# Patient Record
Sex: MALE | Race: WHITE | NOT HISPANIC OR LATINO | URBAN - METROPOLITAN AREA
[De-identification: names, ages, dates, MRNs, and addresses within clinical notes are randomized per-mention and may not be internally consistent; named-entity substitution may affect disease eponyms.]

---

## 2021-10-16 ENCOUNTER — HOSPITAL ENCOUNTER (EMERGENCY)
Facility: HOSPITAL | Age: 46
Discharge: HOME | End: 2021-10-16
Admitting: EMERGENCY MEDICINE

## 2021-10-16 VITALS
OXYGEN SATURATION: 98 % | BODY MASS INDEX: 24.11 KG/M2 | TEMPERATURE: 98 F | SYSTOLIC BLOOD PRESSURE: 146 MMHG | HEART RATE: 88 BPM | HEIGHT: 66 IN | WEIGHT: 150 LBS | RESPIRATION RATE: 16 BRPM | DIASTOLIC BLOOD PRESSURE: 72 MMHG

## 2021-10-16 VITALS
SYSTOLIC BLOOD PRESSURE: 96 MMHG | BODY MASS INDEX: 24.11 KG/M2 | HEIGHT: 66 IN | WEIGHT: 150 LBS | OXYGEN SATURATION: 97 % | DIASTOLIC BLOOD PRESSURE: 59 MMHG | HEART RATE: 64 BPM | TEMPERATURE: 97.8 F | RESPIRATION RATE: 15 BRPM

## 2021-10-16 DIAGNOSIS — F19.10 SUBSTANCE ABUSE (HCC): Primary | ICD-10-CM

## 2021-10-16 DIAGNOSIS — L29.3 ITCHING OF PENIS: ICD-10-CM

## 2021-10-16 DIAGNOSIS — S81.801A LEG WOUND, RIGHT, INITIAL ENCOUNTER: Primary | ICD-10-CM

## 2021-10-16 PROCEDURE — 87147 CULTURE TYPE IMMUNOLOGIC: CPT | Performed by: NURSE PRACTITIONER

## 2021-10-16 PROCEDURE — 87205 SMEAR GRAM STAIN: CPT | Performed by: NURSE PRACTITIONER

## 2021-10-16 PROCEDURE — 96372 THER/PROPH/DIAG INJ SC/IM: CPT

## 2021-10-16 PROCEDURE — 87070 CULTURE OTHR SPECIMN AEROBIC: CPT | Performed by: NURSE PRACTITIONER

## 2021-10-16 PROCEDURE — 99283 EMERGENCY DEPT VISIT LOW MDM: CPT

## 2021-10-16 PROCEDURE — 87186 SC STD MICRODIL/AGAR DIL: CPT | Performed by: NURSE PRACTITIONER

## 2021-10-16 PROCEDURE — 25010000002 CEFTRIAXONE PER 250 MG: Performed by: NURSE PRACTITIONER

## 2021-10-16 RX ORDER — AZITHROMYCIN 250 MG/1
1000 TABLET, FILM COATED ORAL ONCE
Status: COMPLETED | OUTPATIENT
Start: 2021-10-16 | End: 2021-10-16

## 2021-10-16 RX ORDER — SODIUM CHLORIDE 0.9 % (FLUSH) 0.9 %
10 SYRINGE (ML) INJECTION AS NEEDED
Status: DISCONTINUED | OUTPATIENT
Start: 2021-10-16 | End: 2021-10-17 | Stop reason: HOSPADM

## 2021-10-16 RX ADMIN — AZITHROMYCIN 1000 MG: 250 TABLET, FILM COATED ORAL at 07:32

## 2021-10-16 RX ADMIN — LIDOCAINE HYDROCHLORIDE 500 MG: 10 INJECTION, SOLUTION EPIDURAL; INFILTRATION; INTRACAUDAL; PERINEURAL at 07:31

## 2021-10-16 NOTE — ED PROVIDER NOTES
Subjective   Patient is a 46-year-old white male who is brought in by EMS after he was found in a gas station parking lot this morning.  He was apparently found to be yelling and holding his genital area stating that his penis was hurting and itching.  Reportedly the police were on scene and states that the patient was given the option to go to [facility] or go to the hospital and she opted to go to the hospital and EMS was called.  It is also reported that the patient was found to be masturbating on scene.  On arrival to the ED he states he did take ecstasy.  He states he has had some itching to his penis.  Denies any drainage.  Denies any difficulty urinating or painful urination.  Poor historian given his current substance use.          Review of Systems   Constitutional: Negative for fever.   Gastrointestinal: Negative for abdominal pain and vomiting.   Genitourinary: Positive for penile pain. Negative for difficulty urinating, dysuria, penile discharge, penile swelling, scrotal swelling and testicular pain.   Skin: Negative for rash.       No past medical history on file.    Not on File    No past surgical history on file.    No family history on file.    Social History     Socioeconomic History   • Marital status: Unknown           Objective   Physical Exam  Vital signs and triage nurse note reviewed.  Constitutional: Awake, alert; well-developed and well-nourished. No acute distress is noted.  Unkempt, appears to be under the influence.  HEENT: Normocephalic, atraumatic; pupils are PERRL with intact EOM; oropharynx is pink and moist without exudate or erythema.  No drooling or pooling of oral secretions.  Edentulous.  Neck: Supple, full range of motion without pain; no cervical lymphadenopathy. Normal phonation.  Cardiovascular: Regular rate and rhythm, normal S1-S2.  No murmur noted.  Pulmonary: Respiratory effort regular nonlabored, breath sounds clear to auscultation all fields.  Abdomen: Soft, nontender,  nondistended with normoactive bowel sounds; no rebound or guarding.  Genital exam reveals a circumcised male with bilateral descended testicles.  There are no external lesions or rash noted.  No drainage noted.  No redness.  No swelling to the penis or scrotum.  Musculoskeletal: Independent range of motion of all extremities with no palpable tenderness or edema.  Neuro: Alert oriented x3, speech is somewhat garbled but appropriate.  GCS 15.    Skin: Flesh tone, warm, dry, intact; no erythematous or petechial rash or lesion.      Procedures           ED Course                                           MDM  Number of Diagnoses or Management Options  Itching of penis  Substance abuse (HCC)  Diagnosis management comments: Urinalysis was ordered however patient was not able to produce a sample.  He was given Rocephin 500 mg IM as well as 1 g of oral Zithromax.    He is otherwise well-appearing and in no distress with no new complaints.    Diagnosis and treatment plan discussed with patient.  Patient agreeable to plan.   I discussed findings with patient who voices understanding of discharge instructions, signs and symptoms requiring return to ED; discharged improved and in stable condition with follow up for re-evaluation.      Patient Progress  Patient progress: stable      Final diagnoses:   Substance abuse (HCC)   Itching of penis       ED Disposition  ED Disposition     ED Disposition Condition Comment    Discharge Stable           No follow-up provider specified.       Medication List      No changes were made to your prescriptions during this visit.          Jana Malone, YVONNE  10/16/21 0815

## 2021-10-16 NOTE — DISCHARGE INSTRUCTIONS
Follow-up with primary care provider and/or urology if your symptoms persist.  Return for new or worsening symptoms.  Stop using drugs.

## 2021-10-16 NOTE — ED NOTES
"Pt picked up at [employer] station in [place], pt altered and reports \"taking a pink pill with a X on it from some nette on the street.\" Vitals stable      Lucia Smith, MANDY  10/16/21 0614       Lucia Smith, RN  10/16/21 0616    "

## 2021-10-17 ENCOUNTER — HOSPITAL ENCOUNTER (EMERGENCY)
Facility: HOSPITAL | Age: 46
Discharge: LEFT BEFORE DISCHARGE | End: 2021-10-17

## 2021-10-17 ENCOUNTER — HOSPITAL ENCOUNTER (EMERGENCY)
Facility: HOSPITAL | Age: 46
Discharge: HOME | End: 2021-10-17
Attending: EMERGENCY MEDICINE | Admitting: EMERGENCY MEDICINE

## 2021-10-17 ENCOUNTER — APPOINTMENT (OUTPATIENT)
Dept: GENERAL RADIOLOGY | Facility: HOSPITAL | Age: 46
End: 2021-10-17

## 2021-10-17 VITALS
OXYGEN SATURATION: 100 % | RESPIRATION RATE: 15 BRPM | DIASTOLIC BLOOD PRESSURE: 79 MMHG | WEIGHT: 165.79 LBS | BODY MASS INDEX: 23.73 KG/M2 | SYSTOLIC BLOOD PRESSURE: 123 MMHG | HEIGHT: 70 IN | HEART RATE: 64 BPM | TEMPERATURE: 97.2 F

## 2021-10-17 VITALS
WEIGHT: 162.26 LBS | HEIGHT: 70 IN | DIASTOLIC BLOOD PRESSURE: 71 MMHG | HEART RATE: 59 BPM | BODY MASS INDEX: 23.23 KG/M2 | TEMPERATURE: 97.4 F | RESPIRATION RATE: 16 BRPM | SYSTOLIC BLOOD PRESSURE: 110 MMHG | OXYGEN SATURATION: 99 %

## 2021-10-17 DIAGNOSIS — M25.512 LEFT SHOULDER PAIN, UNSPECIFIED CHRONICITY: Primary | ICD-10-CM

## 2021-10-17 PROCEDURE — 73030 X-RAY EXAM OF SHOULDER: CPT

## 2021-10-17 PROCEDURE — 99281 EMR DPT VST MAYX REQ PHY/QHP: CPT

## 2021-10-17 PROCEDURE — 99211 OFF/OP EST MAY X REQ PHY/QHP: CPT

## 2021-10-17 RX ORDER — IBUPROFEN 800 MG/1
800 TABLET ORAL EVERY 8 HOURS PRN
Qty: 30 TABLET | Refills: 0 | Status: SHIPPED | OUTPATIENT
Start: 2021-10-17

## 2021-10-17 NOTE — DISCHARGE INSTRUCTIONS
Avoid heavy lifting or pulling for the next 3 days  Follow-up with an orthopedist if pain persists for possible MRI  Medication as directed

## 2021-10-17 NOTE — DISCHARGE INSTRUCTIONS
Keep wound clean, dry and covered.  Return to ER when you are agreeable to a medical workup and care.

## 2021-10-17 NOTE — ED PROVIDER NOTES
Subjective   46-year-old  male presents to the ER via police escort for complaint of right lower leg wound and back pain.  Patient is a very poor historian and is uncooperative and unable to provide adequate history after numerous attempts of obtaining from patient.  Patient is resting comfortably on his left side on stretcher and not opening his eyes and slurring his words and mumbling.  Onset: Unknown  Location: Lower leg  Duration: Unknown  Character: Oozing and scabbing  Aggravating/Alleviating Factors: Unknown/none  Radiation: None  Severity: Unknown            Review of Systems   Unable to perform ROS: Other (presents appearing under the influence of recreation drugs)   Skin: Positive for wound.       No past medical history on file.    No Known Allergies    No past surgical history on file.    No family history on file.    Social History     Socioeconomic History   • Marital status: Unknown           Objective   Physical Exam  HENT:      Head: Normocephalic and atraumatic.   Cardiovascular:      Rate and Rhythm: Normal rate.   Pulmonary:      Effort: Pulmonary effort is normal.   Musculoskeletal:         General: No swelling or deformity. Normal range of motion.      Cervical back: Normal range of motion and neck supple.      Right lower leg: No edema.      Left lower leg: No edema.   Skin:     General: Skin is warm and dry.      Capillary Refill: Capillary refill takes less than 2 seconds.      Findings: Lesion present.   Neurological:      Motor: No weakness.      Gait: Gait normal.   Psychiatric:         Attention and Perception: He is inattentive. He perceives auditory and visual hallucinations.         Mood and Affect: Affect is blunt.         Speech: Speech is slurred.         Behavior: Behavior is uncooperative and withdrawn.         Thought Content: Thought content is delusional. Thought content does not include homicidal or suicidal ideation. Thought content does not include homicidal or  suicidal plan.         Cognition and Memory: Memory is impaired. He exhibits impaired recent memory and impaired remote memory.         Judgment: Judgment is impulsive.         Procedures           ED Course        Vitals:    10/16/21 2214   BP: 96/59   Pulse: 64   Resp: 15   Temp:    SpO2: 97%     Wound culture obtained from right lower wound/oozing scabbed area.  Labs Reviewed   WOUND CULTURE     Saline Lock, CBC, CMP, ETOH, EDS and cardiac monitoring ordered.  RN attempted to place PIV, but patient refused and refused all workup.  Patient was promptly discharged.                                     MDM    Final diagnoses:   Leg wound, right, initial encounter       ED Disposition  ED Disposition     ED Disposition Condition Comment    Discharge Stable           PATIENT [facility] [address]  [phone]  Schedule an appointment as soon as possible for a visit in 2 days  As needed, If symptoms worsen         Medication List      No changes were made to your prescriptions during this visit.          Susan Orozco, APRN  10/17/21 1132

## 2021-10-17 NOTE — ED NOTES
Went to draw patient labs and urine sample. Pt refused to have labs drawn at this time or provide a urine. Advised patient of need for labs at this time, patient still refused. Provider aware      Srinivasa Wong, RN  10/16/21 3322

## 2021-10-17 NOTE — ED PROVIDER NOTES
Subjective   46-year-old male seen yesterday for urethritis was brought in for evaluation of shoulder pain today. The patient reports no acute trauma to the area. He reports no decrease in sensation or circulation. The patient denies other injuries. He denies chest pain or shortness of breath. He he denies diaphoresis or palpitations. He denies abdominal pain nausea or vomiting. He states that his dysuria is better          Review of Systems   Neurological: Negative for weakness and numbness.   Psychiatric/Behavioral: Positive for behavioral problems. Negative for suicidal ideas.   All other systems reviewed and are negative.      No past medical history on file.  See previous evaluation in the emergency department  No Known Allergies    No past surgical history on file.    No family history on file.    Social History     Socioeconomic History   • Marital status: Unknown           Objective   Physical Exam  .TOMPHYSICALEXAM  Alert Curly Coma Scale 15   HEENT: Pupils equal and reactive to light. Conjunctivae are not injected. normal tympanic membranes. Oropharynx and nares are normal.   Neck: Supple. Midline trachea. No JVD. No goiter. No pain with range of motion  Chest: Clear and equal breath sounds bilaterally regular rate and rhythm without murmur or rub.   Abdomen: Positive bowel sounds nontender nondistended. No rebound or peritoneal signs. No CVA tenderness. No lumbar paraspinal discomfort or SI joint discomfort  Extremities no clubbing cyanosis or edema motor sensory exam is normal the full range of motion is intact patient is able to move the arm for a full range of motion. There is no clinically apparent AC separation there is no pain over the long head of the biceps there is no swelling or muscle belly tenderness   skin: Warm and dry, no rashes or petechia.   Lymphatic: No regional lymphadenopathy. No calf pain, swelling or Mary's sign    Procedures           ED Course                                    Labs Reviewed - No data to display  Medications - No data to display  XR Shoulder 2+ View Left    Result Date: 10/17/2021  No acute fracture or dislocation is identified of the left shoulder.  Electronically Signed By-Kayla Valente MD On:10/17/2021 8:27 AM This report was finalized on [id number] by  Kayla Valente MD.            MDM  Number of Diagnoses or Management Options     Amount and/or Complexity of Data Reviewed  Tests in the radiology section of CPT®: ordered and reviewed    Risk of Complications, Morbidity, and/or Mortality  Presenting problems: high  Diagnostic procedures: high  Management options: high  General comments: The patient was given a prescription for ibuprofen. He was stable at discharge and vocalized understanding of discharge instructions and warnings. The patient's shoulder pain may be subacute as he later reported he had had this for several months. He is encouraged to follow-up with an orthopedist        Final diagnoses:   Left shoulder pain, unspecified chronicity       ED Disposition  ED Disposition     ED Disposition Condition Comment    Discharge Stable           Orthopedist Dr. Ewing  [phone]             Medication List      New Prescriptions    ibuprofen 800 MG tablet  Commonly known as: ADVIL,MOTRIN  Take 1 tablet by mouth Every 8 (Eight) Hours As Needed for Mild Pain  or Moderate Pain .           Where to Get Your Medications      These medications were sent to [facility]/pharmacy #[id number] - [place], [place] - [address] [phone] [facility] [phone]   [address] [facility] [place] [address]    Hours: 24-hours Phone: [phone]   · ibuprofen 800 MG tablet          Ernesto Miguel MD  10/17/21 0952

## 2021-10-18 ENCOUNTER — HOSPITAL ENCOUNTER (EMERGENCY)
Facility: HOSPITAL | Age: 46
Discharge: HOME | End: 2021-10-18
Admitting: EMERGENCY MEDICINE

## 2021-10-18 ENCOUNTER — HOSPITAL ENCOUNTER (EMERGENCY)
Facility: HOSPITAL | Age: 46
Discharge: HOME | End: 2021-10-18
Attending: EMERGENCY MEDICINE | Admitting: EMERGENCY MEDICINE

## 2021-10-18 VITALS
HEIGHT: 68 IN | RESPIRATION RATE: 14 BRPM | HEART RATE: 64 BPM | BODY MASS INDEX: 25.01 KG/M2 | OXYGEN SATURATION: 98 % | DIASTOLIC BLOOD PRESSURE: 82 MMHG | TEMPERATURE: 97.8 F | WEIGHT: 165 LBS | SYSTOLIC BLOOD PRESSURE: 128 MMHG

## 2021-10-18 VITALS
HEIGHT: 68 IN | RESPIRATION RATE: 13 BRPM | OXYGEN SATURATION: 99 % | DIASTOLIC BLOOD PRESSURE: 80 MMHG | BODY MASS INDEX: 25.01 KG/M2 | TEMPERATURE: 97.5 F | HEART RATE: 80 BPM | SYSTOLIC BLOOD PRESSURE: 121 MMHG | WEIGHT: 165 LBS

## 2021-10-18 DIAGNOSIS — R68.89 SENSE OF BEING COLD: Primary | ICD-10-CM

## 2021-10-18 DIAGNOSIS — Z00.00 NORMAL EXAM: Primary | ICD-10-CM

## 2021-10-18 LAB
ANION GAP SERPL CALCULATED.3IONS-SCNC: 10 MMOL/L (ref 5–15)
BASOPHILS # BLD AUTO: 0 10*3/MM3 (ref 0–0.2)
BASOPHILS NFR BLD AUTO: 0.7 % (ref 0–1.5)
BUN SERPL-MCNC: 9 MG/DL (ref 6–20)
BUN/CREAT SERPL: 15.8 (ref 7–25)
CALCIUM SPEC-SCNC: 8.2 MG/DL (ref 8.6–10.5)
CHLORIDE SERPL-SCNC: 108 MMOL/L (ref 98–107)
CO2 SERPL-SCNC: 23 MMOL/L (ref 22–29)
CREAT SERPL-MCNC: 0.57 MG/DL (ref 0.76–1.27)
DEPRECATED RDW RBC AUTO: 49 FL (ref 37–54)
EOSINOPHIL # BLD AUTO: 0.3 10*3/MM3 (ref 0–0.4)
EOSINOPHIL NFR BLD AUTO: 5.7 % (ref 0.3–6.2)
ERYTHROCYTE [DISTWIDTH] IN BLOOD BY AUTOMATED COUNT: 14.7 % (ref 12.3–15.4)
GFR SERPL CREATININE-BSD FRML MDRD: >150 ML/MIN/1.73
GLUCOSE SERPL-MCNC: 86 MG/DL (ref 65–99)
HCT VFR BLD AUTO: 37.2 % (ref 37.5–51)
HGB BLD-MCNC: 12.6 G/DL (ref 13–17.7)
LYMPHOCYTES # BLD AUTO: 2.8 10*3/MM3 (ref 0.7–3.1)
LYMPHOCYTES NFR BLD AUTO: 46.2 % (ref 19.6–45.3)
MCH RBC QN AUTO: 32.4 PG (ref 26.6–33)
MCHC RBC AUTO-ENTMCNC: 34 G/DL (ref 31.5–35.7)
MCV RBC AUTO: 95.3 FL (ref 79–97)
MONOCYTES # BLD AUTO: 0.6 10*3/MM3 (ref 0.1–0.9)
MONOCYTES NFR BLD AUTO: 10.6 % (ref 5–12)
NEUTROPHILS NFR BLD AUTO: 2.2 10*3/MM3 (ref 1.7–7)
NEUTROPHILS NFR BLD AUTO: 36.8 % (ref 42.7–76)
NRBC BLD AUTO-RTO: 0 /100 WBC (ref 0–0.2)
PLATELET # BLD AUTO: 234 10*3/MM3 (ref 140–450)
PMV BLD AUTO: 7.1 FL (ref 6–12)
POTASSIUM SERPL-SCNC: 3.7 MMOL/L (ref 3.5–5.2)
RBC # BLD AUTO: 3.9 10*6/MM3 (ref 4.14–5.8)
SODIUM SERPL-SCNC: 141 MMOL/L (ref 136–145)
TSH SERPL DL<=0.05 MIU/L-ACNC: 1.95 UIU/ML (ref 0.27–4.2)
WBC # BLD AUTO: 6.1 10*3/MM3 (ref 3.4–10.8)

## 2021-10-18 PROCEDURE — 84443 ASSAY THYROID STIM HORMONE: CPT | Performed by: NURSE PRACTITIONER

## 2021-10-18 PROCEDURE — 99282 EMERGENCY DEPT VISIT SF MDM: CPT

## 2021-10-18 PROCEDURE — 80048 BASIC METABOLIC PNL TOTAL CA: CPT | Performed by: NURSE PRACTITIONER

## 2021-10-18 PROCEDURE — 85025 COMPLETE CBC W/AUTO DIFF WBC: CPT | Performed by: NURSE PRACTITIONER

## 2021-10-18 PROCEDURE — 99283 EMERGENCY DEPT VISIT LOW MDM: CPT

## 2021-10-18 NOTE — ED PROVIDER NOTES
"Subjective    Chief Complaint   Patient presents with   • cold     Provider, No Known  No LMP for male patient.  No Known Allergies  COVID status:     History of Present Illness  Patient is a 46-year-old male presents emergency department stating he feels cold and shaky.  He provides no other information.  Denies any pain however.  He is sleeping but arouses to speech.  Review of Systems   Constitutional: Negative for chills and fever.        \"Feels cold\"   Respiratory: Negative for shortness of breath.    Cardiovascular: Negative for chest pain and leg swelling.   Gastrointestinal: Negative for abdominal pain.   Musculoskeletal: Negative for back pain and neck pain.   Skin: Negative for color change and rash.   Neurological: Negative for dizziness, syncope, light-headedness and headaches.       No past medical history on file.    No Known Allergies    No past surgical history on file.    No family history on file.    Social History     Socioeconomic History   • Marital status: Unknown           Objective   Physical Exam  Vitals and nursing note reviewed.   Neurological:      General: No focal deficit present.      Mental Status: He is oriented to person, place, and time.         Procedures           ED Course  ED Course as of 10/18/21 1847   Mon Oct 18, 2021   0059 Patient ambulated to the room from triage without difficulty. [LB]   0234 Patient sitting up eating without acute distress. [LB]      ED Course User Index  [LB] Fariha Dougherty, APRN      /82 (BP Location: Left arm, Patient Position: Lying)   Pulse 64   Temp 97.8 °F (36.6 °C) (Oral)   Resp 14   Ht 172.7 cm (68\")   Wt 74.8 kg (165 lb)   SpO2 98%   BMI 25.09 kg/m²   Labs Reviewed   BASIC METABOLIC PANEL - Abnormal; Notable for the following components:       Result Value    Creatinine 0.57 (*)     Chloride 108 (*)     Calcium 8.2 (*)     All other components within normal limits    Narrative:     GFR Normal >60  Chronic Kidney Disease " <60  Kidney Failure <15     CBC WITH AUTO DIFFERENTIAL - Abnormal; Notable for the following components:    RBC 3.90 (*)     Hemoglobin 12.6 (*)     Hematocrit 37.2 (*)     Neutrophil % 36.8 (*)     Lymphocyte % 46.2 (*)     All other components within normal limits   TSH - Normal   CBC AND DIFFERENTIAL    Narrative:     The following orders were created for panel order CBC & Differential.  Procedure                               Abnormality         Status                     ---------                               -----------         ------                     CBC Auto Differential[[id number]]        Abnormal            Final result                 Please view results for these tests on the individual orders.     Medications - No data to display  XR Shoulder 2+ View Left    Result Date: 10/17/2021  No acute fracture or dislocation is identified of the left shoulder.  Electronically Signed By-Kayla Valente MD On:10/17/2021 8:27 AM This report was finalized on [id number] by  Kayla Valente MD.                                         MDM  Patient is a 46-year-old female presents emergency department with a complaint of being cold.  He denies any other complaints.  He is awake alert and eating a meal at the bedside.  He reports he has no place to stay.  He is working to attempt to assist him to home with shelter.  He will be discharged.     I spoke with the patient at the bedside regarding their plan of care, discharge instruction, home care, prescriptions, and importance follow-up.  We discussed test results at the bedside, including incidental abnormal labs, radiological findings, understands need for follow-up with primary care or specialist if indicated.      Patient was made aware of indications to return to the emergency department.  Patient agrees with the current plan of care for discharge, verbalized understanding of all instructions    Pt is aware that discharge does not mean that nothing is wrong but it  indicates no emergency is present and they must continue care with follow-up as given below or physician of their choice  Final diagnoses:   Sense of being cold       ED Disposition  ED Disposition     ED Disposition Condition Comment    Discharge Stable Patient educated on availability of homeless shelter as well as need for follow up care.          [facility] EMERGENCY DEPARTMENT  [address] [address] [phone]    As needed, If symptoms worsen    PATIENT [facility] [address]  [phone]  Schedule an appointment as soon as possible for a visit   Call for assistance with follow up with Primary care provider-call tomorrow.         Medication List      No changes were made to your prescriptions during this visit.          Fariha Dougherty, APRN  10/18/21 4777

## 2021-10-18 NOTE — NURSING NOTE
Patient refused to leave upon discharge, information given for homeless shelters security called to escort patient out.

## 2021-10-18 NOTE — DISCHARGE INSTRUCTIONS
Please follow-up with your primary care provider, call tomorrow for an appointment, if you do not have a primary care provider, please use the patient connection above to us to help establish care, please call as soon as possible.    Return the emergency department for new or worsening symptoms    Take medications as prescribed, any changes will be noted on your discharge instruction

## 2021-10-18 NOTE — ED NOTES
Patient would not speak during assessment, so I was unable to obtain any information or complaints. The patient did let me draw labs.     Riana Germain, RN  10/18/21 0158

## 2021-10-18 NOTE — ED PROVIDER NOTES
Subjective   Patient is a 46-year male who states he is cold and wants a place to stay. He has no medical complaints. He has been seen five times in the past 48 hours.          Review of Systems  Negative for cough fever chest pain vomiting or other complaint  No past medical history on file.    No Known Allergies    No past surgical history on file.    No family history on file.    Social History     Socioeconomic History   • Marital status: Unknown           Objective   Physical Exam  General exam shows a 46 male no distress. HEENT exam shows TMs to be clear. Oropharynx clear. Neck has no adenopathy JVD or bruits. Lungs clear. Heart has regular rhythm. Abdomen soft nontender. Extremity exam unremarkable.  Procedures           ED Course                                           MDM  Number of Diagnoses or Management Options  Diagnosis management comments: Patient was given options for places to stay since he is homeless. He will be discharged to follow with his MD as needed.    Risk of Complications, Morbidity, and/or Mortality  Presenting problems: moderate  Diagnostic procedures: moderate  Management options: moderate    Patient Progress  Patient progress: stable      Final diagnoses:   Normal exam       ED Disposition  ED Disposition     ED Disposition Condition Comment    Discharge Stable           No follow-up provider specified.       Medication List      No changes were made to your prescriptions during this visit.          Xavi Ye MD  10/18/21 0635

## 2021-10-19 NOTE — PHARMACY RECOMMENDATION
The results of this culture are considered probable contamination after discussion with Dr. Yamil Jewell. No treatment recommended for the results of this culture. No further follow-up required.    Microbiology Results (last 10 days)       Procedure Component Value - Date/Time    Wound Culture - Wound, Ankle, Right [[id number]]  (Abnormal) Collected: 10/16/21 2148    Lab Status: Preliminary result Specimen: Wound from Ankle, Right Updated: 10/19/21 1228     Wound Culture Rare Gram Negative Bacilli      Scant growth (1+) Staphylococcus aureus      Scant growth (1+) Streptococcus pyogenes (Group A)     Comment: This organism is considered to be universally susceptible to penicillin.  No further antibiotic testing will be performed. If Clindamycin or Erythromycin is the drug of choice, notify the laboratory within 7 days to request susceptibility testing.         Scant growth (1+) Normal Skin Zuleika     Gram Stain Many (4+) WBCs per low power field      Rare (1+) Gram positive cocci            Mare Burgess [facility]  10/19/2021 16:13 EDT

## 2021-10-20 LAB
BACTERIA SPEC AEROBE CULT: ABNORMAL
GRAM STN SPEC: ABNORMAL
GRAM STN SPEC: ABNORMAL